# Patient Record
Sex: FEMALE | NOT HISPANIC OR LATINO | Employment: FULL TIME | URBAN - METROPOLITAN AREA
[De-identification: names, ages, dates, MRNs, and addresses within clinical notes are randomized per-mention and may not be internally consistent; named-entity substitution may affect disease eponyms.]

---

## 2019-08-15 ENCOUNTER — HOSPITAL ENCOUNTER (OUTPATIENT)
Dept: OTHER | Facility: HOSPITAL | Age: 64
Discharge: HOME | End: 2019-08-15
Attending: PHYSICIAN ASSISTANT

## 2019-08-29 ENCOUNTER — OFFICE VISIT (OUTPATIENT)
Dept: ORTHOPEDIC SURGERY | Facility: CLINIC | Age: 64
End: 2019-08-29

## 2019-08-29 DIAGNOSIS — M75.121 COMPLETE TEAR OF RIGHT ROTATOR CUFF: Primary | ICD-10-CM

## 2019-08-29 PROCEDURE — 99204 OFFICE O/P NEW MOD 45 MIN: CPT | Performed by: ORTHOPAEDIC SURGERY

## 2019-09-03 NOTE — PROGRESS NOTES
NEW VISIT    Patient: Maria Teresa Bergeron  ?  YOB: 1955    MRN: [id number]  ?  Chief Complaint   Patient presents with   • Right Shoulder - Pain, Establish Care      ?  HPI: The patient presents with right shoulder pain for the past 3 months.  It is moderate in severity and intermittent in nature.  There is associated clicking and popping at mid abduction.  She tends to drop her arm from the abducted position because of the pain and discomfort.  She denies any obvious history of trauma.  She does work in a bank as a teller and states that she has to repetitively use the upper extremity for day-to-day function.  The patient states that the shoulder dysfunction is affecting her daily life because of the pain.  She has tried Celebrex for anti-inflammatory medication which is somewhat helpful in managing her symptoms.  She has now started to develop some rest pain and night as well.  She is considering surgical options because she does not want temporary modalities of care for this condition.    Pain Location: right shoulder(s)  Radiation: none  Quality: sharp, stabbing  Intensity/Severity: moderate  Duration: 3 month(s)  Onset quality: gradual   Timing: intermittent  Aggravating Factors: reaching forward, reaching backward, reaching across the body and repetitive motion  Alleviating Factors: OTC analgesics, NSAID's and rest  Previous Episodes: none mentioned  Associated Symptoms: pain, swelling, decreased ROM, decreased strength  ADL Affected: personal cares, grooming and dressing upper body  Previous Treatment: Anti-inflammatory medication and physical therapy.    This patient is a new patient.  This problem is new to this examiner.      Allergies: Allergies not on file    Medications:   Home Medications:  No current outpatient medications on file prior to visit.     No current facility-administered medications on file prior to visit.      Current Medications:  Scheduled Meds:  PRN Meds:.    I have reviewed  the patient's medical history in detail and updated the computerized patient record.  Review and summarization of old records include:    No past medical history on file.  No past surgical history on file.  Social History     Occupational History   • Not on file   Tobacco Use   • Smoking status: Not on file   Substance and Sexual Activity   • Alcohol use: Not on file   • Drug use: Not on file   • Sexual activity: Not on file      Social History     Social History Narrative   • Not on file     No family history on file.      Review of Systems  Constitutional: Negative for appetite change.   HENT: Negative.    Eyes: Negative.    Respiratory: Negative.    Cardiovascular: Negative.    Gastrointestinal: Negative.    Endocrine: Negative.    Genitourinary: Negative.    Musculoskeletal: See details of exam below.  Skin: Negative.    Allergic/Immunologic: Negative.    Hematological: Negative.    Psychiatric/Behavioral: Negative.         Wt Readings from Last 3 Encounters:   No data found for Wt     Ht Readings from Last 3 Encounters:   No data found for Ht     There is no height or weight on file to calculate BMI.  No height and weight on file for this encounter.  There were no vitals filed for this visit.      Physical Exam  Constitutional: Patient is oriented to person, place, and time. Appears well-developed and well-nourished.   HENT:   Head: Normocephalic and atraumatic.   Eyes: Conjunctivae and EOM are normal. Pupils are equal, round, and reactive to light.   Cardiovascular: Normal rate, regular rhythm, normal heart sounds and intact distal pulses.   Pulmonary/Chest: Effort normal and breath sounds normal.   Musculoskeletal:   See detailed exam below   Neurological: Alert and oriented to person, place, and time. No sensory deficit. Coordination normal.   Skin: Skin is warm and dry. Capillary refill takes less than 2 seconds. No rash noted. No erythema.   Psychiatric: Patient has a normal mood and affect. Her behavior  is normal. Judgment and thought content normal.   Nursing note and vitals reviewed.      Ortho Exam:   Right shoulder (rct). The shoulder is extremely tender anteriorly. There is tenderness over the insertion of the rotator cuff over the greater tuberosity of the humerus. Slight proximal migration of the humeral head is noted. AC joint is tender. Crossover adduction test is positive. Drop arm sign is positive. Forward flexion is 0-110 degrees, abduction is 0-110 degrees, external rotation is 0-30 degrees. Patient has mild atrophy both of the supra and infraspinatus fossa. Sulcus sign is negative. There is no evidence of multidirectional instability. Neer test is positive on compression. Skin and soft tissue tenderness is noted. Patient's strength in abduction and external rotation are extremely lacking. The pain level is 6.      Diagnostics:  Reviewed MRI report of right shoulder, impression below:  The patient has an MRI which shows a full-thickness tear of the junction of the supra and the infraspinatus tendon.  There is 1.9 cm of retraction of the tendon.  The tendon gap is 0.9 cm.  There is some flattening of the superior lateral aspect of the humeral head possibly consistent with the previous fracture of the head.  No current edema is noted indicating that this is most likely a chronic injury.  There is a degenerative tear of the inferior labrum as well.  My recommendation is to proceed with arthroscopic surgery and mini open repair of the rotator cuff is based on clinical correlation of these findings along with MRI interpretation of the shoulder pathology.     Assessment:  Maria Teresa was seen today for pain and establish care.    Diagnoses and all orders for this visit:    Complete tear of right rotator cuff          Procedures  ?    Plan    · Intra-articular steroid injection discussed with the patient but she states that she would like to proceed with more definitive forms of care such as surgical  intervention.  · Rest, ice, compression, and elevation (RICE) therapy  · Stretching and strengthening exercises of the rotator cuff musculature.    · Surgical arthroscopy and mini open repair of the rotator cuff discussed with the patient to the extent of 45 minutes.  Greater than 50% of my time was spent face-to-face with the patient going over the treatment options of the potential complications and the time off work following the scheduling of the surgery.  Patient has been diagnosed with a rotator cuff tear.  These tears can range from partial tears to complete tears of the muscle and/or tendon. They can also be categorized in size by width as being small, medium or large.  Diagnosis is confirmed with MRI or CT/arthrogram.  Management of these tears can be conservative with injections, physical therapy, activity modification and use of NSAIDS as needed.  Surgery is the only way to actually fix these tears, as they will not heal or repair on their own.  These tears can usually be repaired with arthroscopic surgery, but occasionally open procedures are necessary.  Many factors can influence the outcomes. First, larger tears have a higher chance of failure from a healing perspective, although pain may improve nonetheless, potentially longer recovery and sometimes, depending on the quality of the tissue and the length of time the tear has been present, may not be repairable at all.  If the tear has been going on for a long time, the muscle can actually change to fatty tissue, and no longer act as muscle.  This can have a direct effect on not only the ability to repair the tear but also the outcome from the surgery itself both from a healing and functional perspective.  Therefore, the decision to proceed with surgery does have a time factor which can affect the quality of the tissue and reparability but also the potential for the tear to increase in size.  The longer you wait to repair the torn tendon there can be  decreased potential for a successful outcome.    · Risks of surgery have been discussed with the patient in detail.  These risks include but are not limited to possibility of infection, DVT, continued pain, continued progression of arthritis, use of allograft tissue, limited range of motion and strength and further surgical intervention.  Patient understands that the surgery will benefit mechanical symptoms of pain and instability but may not help with symptoms of arthritis.    · OTC Tylenol 500-1000mg by mouth every 6 hours as needed for pain   · Follow up in 6 week(s)    Date of encounter: 08/29/2019   Perico Elkins MD

## 2019-11-01 ENCOUNTER — TELEPHONE (OUTPATIENT)
Dept: ORTHOPEDIC SURGERY | Facility: CLINIC | Age: 64
End: 2019-11-01

## 2021-08-31 ENCOUNTER — TELEPHONE (OUTPATIENT)
Dept: GASTROENTEROLOGY | Facility: CLINIC | Age: 66
End: 2021-08-31

## 2024-08-05 ENCOUNTER — TELEPHONE (OUTPATIENT)
Dept: NEUROLOGY | Facility: CLINIC | Age: 69
End: 2024-08-05
Payer: MEDICARE

## 2024-08-05 NOTE — TELEPHONE ENCOUNTER
Provider: [facility]    Caller: SWAPNIL CONDE    Relationship to Patient: SELF      Phone Number: [phone]    Reason for Call: PATIENT REQUESTED AN APPOINTMENT REMINDER BE MAILED TO HER HOME    THANK YOU

## 2024-09-03 ENCOUNTER — OFFICE VISIT (OUTPATIENT)
Dept: NEUROLOGY | Facility: CLINIC | Age: 69
End: 2024-09-03
Payer: MEDICARE

## 2024-09-03 VITALS
HEIGHT: 63 IN | BODY MASS INDEX: 27.46 KG/M2 | OXYGEN SATURATION: 92 % | SYSTOLIC BLOOD PRESSURE: 114 MMHG | WEIGHT: 155 LBS | DIASTOLIC BLOOD PRESSURE: 68 MMHG | HEART RATE: 90 BPM | TEMPERATURE: 97.8 F

## 2024-09-03 DIAGNOSIS — K21.9 GASTROESOPHAGEAL REFLUX DISEASE WITHOUT ESOPHAGITIS: ICD-10-CM

## 2024-09-03 DIAGNOSIS — Z86.73 HISTORY OF TIA (TRANSIENT ISCHEMIC ATTACK): Primary | ICD-10-CM

## 2024-09-03 DIAGNOSIS — I65.23 BILATERAL CAROTID ARTERY STENOSIS: ICD-10-CM

## 2024-09-03 RX ORDER — VENLAFAXINE HYDROCHLORIDE 150 MG/1
150 CAPSULE, EXTENDED RELEASE ORAL
COMMUNITY

## 2024-09-03 RX ORDER — CYCLOBENZAPRINE HCL 5 MG
5 TABLET ORAL 2 TIMES DAILY PRN
COMMUNITY

## 2024-09-03 RX ORDER — HYDROXYZINE PAMOATE 25 MG/1
25 CAPSULE ORAL
COMMUNITY
Start: 2024-06-20

## 2024-09-03 RX ORDER — CLOPIDOGREL BISULFATE 75 MG/1
1 TABLET ORAL DAILY
COMMUNITY
Start: 2024-07-22 | End: 2024-09-03 | Stop reason: SDUPTHER

## 2024-09-03 RX ORDER — CHOLECALCIFEROL (VITAMIN D3) 1250 MCG
50000 CAPSULE ORAL WEEKLY
COMMUNITY

## 2024-09-03 RX ORDER — PANTOPRAZOLE SODIUM 20 MG/1
20 TABLET, DELAYED RELEASE ORAL DAILY
Qty: 30 TABLET | Refills: 5 | Status: SHIPPED | OUTPATIENT
Start: 2024-09-03 | End: 2025-09-03

## 2024-09-03 RX ORDER — OMEPRAZOLE 40 MG/1
40 CAPSULE, DELAYED RELEASE ORAL DAILY
COMMUNITY
End: 2024-09-03

## 2024-09-03 RX ORDER — CELECOXIB 200 MG/1
1 CAPSULE ORAL DAILY
COMMUNITY
Start: 2024-03-22

## 2024-09-03 RX ORDER — LEVOTHYROXINE SODIUM 88 MCG
1 TABLET ORAL DAILY
COMMUNITY
Start: 2024-07-24

## 2024-09-03 RX ORDER — ASPIRIN 81 MG
TABLET,CHEWABLE ORAL
COMMUNITY

## 2024-09-03 RX ORDER — CYANOCOBALAMIN (VITAMIN B-12) 1000 MCG
1 TABLET ORAL DAILY
COMMUNITY
Start: 2024-06-19

## 2024-09-03 RX ORDER — VENLAFAXINE HYDROCHLORIDE 75 MG/1
75 CAPSULE, EXTENDED RELEASE ORAL DAILY
COMMUNITY

## 2024-09-03 RX ORDER — CLOPIDOGREL BISULFATE 75 MG/1
75 TABLET ORAL DAILY
Qty: 30 TABLET | Refills: 1 | Status: SHIPPED | OUTPATIENT
Start: 2024-09-03

## 2024-09-03 RX ORDER — TRAZODONE HYDROCHLORIDE 50 MG/1
25 TABLET, FILM COATED ORAL
COMMUNITY

## 2024-09-03 RX ORDER — ALIROCUMAB 75 MG/ML
INJECTION, SOLUTION SUBCUTANEOUS
COMMUNITY
Start: 2024-07-30

## 2024-09-03 NOTE — PROGRESS NOTES
New Patient Office Visit      Encounter Date: 2024   Patient Name: Maria Teresa Bergeron  : 1955   MRN: [id number]   PCP: Martha Novoa APRN    Referring Provider: AUSTYN Sauer     Chief Complaint:    Chief Complaint   Patient presents with    Establish Care       History of Present Illness: Maria Teresa Bergeron is a 69 y.o. female who is here today to establish care.  Patient has a history of hypothyroidism, HLD, GERD and arthritis. Patient was seen at [facility] ED on 24 for a transient neurologic episode. She experienced right arm, neck pain and stiffness and dysarthria. Her symptoms subsided. CT head was negative for acute intracranial abnormality but does show chronic small vessel changes. CTA head and neck was negative for LVO but did show evidence of bilateral carotid stenosis, right ICA 50-60% and left 20%. MRI brain was negative for acute ischemic stroke but did show evidence of chronic small vessel changes and diffuse cerebral atrophy. Echocardiogram was negative for cardioembolic source of stroke. Triglycerides were found to be highly elevated at 824 and patient had borderline A1C of 6.4. Patient was discharged on ASA 81 mg, Plavix 75 mg.     Clinic visit 9/3/2024: Patient presents to clinic today to establish care following her recent admission for TIA. Etiology was felt to be atheroembolism. Patient was already sent to vascular surgery regarding her carotid stenosis. She cannot recall the name of the surgeon. She reports that vascular surgery did not feel she required any surgical intervention at this time. She will follow up in their office in 6 months. Patient reports she was told to continue ASA and Plavix but was not told how long. I have recommended a carotid U/S to establish a baseline for future follow up. Once this is completed and if stable will transition to antiplatelet monotherapy. She was taking ASA 81 mg daily when the event happened so likely will transition to Plavix  monotherapy.     Stroke Risk Factors: carotid stenosis, diabetes mellitus, hyperlipidemia, and hypertension      Subjective      Review of Systems:   Review of Systems   Constitutional:  Negative for activity change, appetite change, chills, diaphoresis, fatigue, fever, unexpected weight gain and unexpected weight loss.   Eyes:  Negative for blurred vision, double vision, photophobia and visual disturbance.   Respiratory:  Negative for cough and shortness of breath.    Cardiovascular:  Negative for chest pain, palpitations and leg swelling.   Gastrointestinal:  Negative for blood in stool, nausea and vomiting.   Genitourinary:  Negative for hematuria.   Musculoskeletal:  Positive for arthralgias and neck pain. Negative for gait problem.   Neurological:  Negative for dizziness, tremors, seizures, syncope, facial asymmetry, speech difficulty, weakness, light-headedness, numbness, headache, memory problem and confusion.   Psychiatric/Behavioral:  Negative for depressed mood.        Past Medical History:   Past Medical History:   Diagnosis Date    TIA (transient ischemic attack)        Past Surgical History:   Past Surgical History:   Procedure Laterality Date    GALLBLADDER SURGERY      TOTAL SHOULDER REPLACEMENT         Family History:   Family History   Problem Relation Age of Onset    Heart attack Mother     Alzheimer's disease Father        Social History:   Social History     Socioeconomic History    Marital status:    Tobacco Use    Smoking status: Never     Passive exposure: Never    Smokeless tobacco: Never   Vaping Use    Vaping status: Never Used   Substance and Sexual Activity    Alcohol use: Yes     Comment: occ    Drug use: Never    Sexual activity: Defer       Medications:     Current Outpatient Medications:     Aspirin Low Dose 81 MG chewable tablet, CHEW ONE TABLET BY MOUTH EVERY DAY, Disp: , Rfl:     celecoxib (CeleBREX) 200 MG capsule, Take 1 capsule by mouth Daily., Disp: , Rfl:      "Cholecalciferol (Vitamin D3) 1.25 MG (16475 UT) capsule, Take 1 capsule by mouth 1 (One) Time Per Week., Disp: , Rfl:     clopidogrel (PLAVIX) 75 MG tablet, Take 1 tablet by mouth Daily., Disp: 30 tablet, Rfl: 1    Cyanocobalamin (B-12) 1000 MCG tablet, Take 1 tablet by mouth Daily., Disp: , Rfl:     cyclobenzaprine (FLEXERIL) 5 MG tablet, Take 1 tablet by mouth 2 (Two) Times a Day As Needed for Muscle Spasms., Disp: , Rfl:     hydrOXYzine pamoate (VISTARIL) 25 MG capsule, 1 capsule., Disp: , Rfl:     Praluent 75 MG/ML solution auto-injector, INJECT 75MG UNDER THE SKIN EVERY TWO WEEKS, Disp: , Rfl:     Synthroid 88 MCG tablet, Take 1 tablet by mouth Daily., Disp: , Rfl:     traZODone (DESYREL) 50 MG tablet, Take 0.5 tablets by mouth every night at bedtime., Disp: , Rfl:     venlafaxine XR (EFFEXOR-XR) 150 MG 24 hr capsule, 1 capsule., Disp: , Rfl:     venlafaxine XR (EFFEXOR-XR) 75 MG 24 hr capsule, Take 1 capsule by mouth Daily., Disp: , Rfl:     pantoprazole (Protonix) 20 MG EC tablet, Take 1 tablet by mouth Daily., Disp: 30 tablet, Rfl: 5    Allergies:   Allergies   Allergen Reactions    Codeine Nausea Only     Makes \"sleepy and make me sick at my stomach\"    Penicillin G Rash       Objective     Physical Exam:  Vital Signs:   Vitals:    09/03/24 1357   BP: 114/68   Pulse: 90   Temp: 97.8 °F (36.6 °C)   SpO2: 92%   Weight: 70.3 kg (155 lb)   Height: 160 cm (63\")     Body mass index is 27.46 kg/m².     Physical Exam  Vitals and nursing note reviewed.   Constitutional:       General: She is awake. She is not in acute distress.     Appearance: Normal appearance. She is normal weight. She is not ill-appearing.      Comments: 69 year old  female    HENT:      Head: Normocephalic and atraumatic.      Nose: Nose normal.      Mouth/Throat:      Mouth: Mucous membranes are moist.   Eyes:      General: Lids are normal.      Extraocular Movements: Extraocular movements intact.      Pupils: Pupils are equal, round, " and reactive to light.   Cardiovascular:      Rate and Rhythm: Normal rate and regular rhythm.      Pulses: Normal pulses.   Pulmonary:      Effort: Pulmonary effort is normal. No respiratory distress.   Skin:     General: Skin is warm and dry.   Neurological:      General: No focal deficit present.      Mental Status: She is alert and oriented to person, place, and time. Mental status is at baseline.      Motor: Motor strength is normal.     Coordination: Coordination is intact.   Psychiatric:         Mood and Affect: Mood normal.         Speech: Speech normal.         Behavior: Behavior normal.       Neurological Exam  Mental Status  Awake and alert. Oriented to person, place, time and situation. Oriented to person, place, and time. Speech is normal. Language is fluent with no aphasia. Attention and concentration are normal. Fund of knowledge is appropriate for level of education.    Cranial Nerves  CN II: Visual acuity is normal. Visual fields full to confrontation.  CN III, IV, VI: Extraocular movements intact bilaterally. Normal lids and orbits bilaterally. Pupils equal round and reactive to light bilaterally.  CN V: Facial sensation is normal.  CN VII: Full and symmetric facial movement.  CN VIII: Hearing is normal to speech .  CN XI: Shoulder shrug strength is normal.  CN XII: Tongue midline without atrophy or fasciculations.    Motor  Normal muscle bulk throughout. No fasciculations present. Normal muscle tone. Strength is 5/5 throughout all four extremities.    Sensory  Light touch is normal in upper and lower extremities. Pinprick is normal in upper and lower extremities.     Coordination    Finger-to-nose, rapid alternating movements and heel-to-shin normal bilaterally without dysmetria.  No obvious dysmetria .    Gait  Casual gait is normal including stance, stride, and arm swing.     NIH 0     Modified Bynum Score: 0        0  No Symptoms    1 No significant disability. Able to carry out all usual  "activities, despite some symptoms.    2 Slight disability. Able to look after own affairs without assistance, but unable to carry out all previous activities.    3 Moderate disability. Requires some help, but able to walk unassisted.    4 Moderately severe disability. Unable to attend to own bodily needs without assistance, and unable to walk unassisted.    5 Severe disability. Requires constant nursing care and attention, bedridden, incontinent.    6 Dead       PHQ-9 Depression Screening  Little interest or pleasure in doing things? 0-->not at all   Feeling down, depressed, or hopeless? 0-->not at all   Trouble falling or staying asleep, or sleeping too much?     Feeling tired or having little energy?     Poor appetite or overeating?     Feeling bad about yourself - or that you are a failure or have let yourself or your family down?     Trouble concentrating on things, such as reading the newspaper or watching television?     Moving or speaking so slowly that other people could have noticed? Or the opposite - being so fidgety or restless that you have been moving around a lot more than usual?     Thoughts that you would be better off dead, or of hurting yourself in some way?     PHQ-9 Total Score 0   If you checked off any problems, how difficult have these problems made it for you to do your work, take care of things at home, or get along with other people?          STOP-Bang Score  Have you been diagnosed with Sleep Apnea?: no  Snoring?: no  Tired?: no  Observed?: no  Pressure?: no  Stop Score: 0  Body Mass Index more than 35 kg/m2?: no  Age older than 50 year old?: yes  Neck large? \">17\"/43cm-M, >16\"/41cm-F: no  Gender=Male?: no  Total Stop-Bang Score: 1       [facility] Fall Risk Clinician Key Questions   Have you fallen in the past year?: No  Do you feel unsteady with walking?: No  Are you worried about falling?: No      Imaging Reviewed:   Disc sent for upload  Laboratory Results:   A1C- 6.5  Total cholesterol- " 266  Triglycerides- 824  LDL- not reported       Assessment / Plan      Assessment/Plan:   Diagnoses and all orders for this visit:    1. History of TIA (transient ischemic attack) (Primary)  -discharged from [facility] on DAPT. Will continue ASA 81 mg and Plavix 75 mg for now. Will likely transition to Plavix monotherapy in the future after carotid U/S completed and stable.   -continue statin   -normal BP goals, <130/80    -if patient reports palpitations in the future, would consider holter  -reviewed lifestyle modification such as heart healthy diet and increased activity   -reviewed s/sxs of stroke and when to call 911  -follow up in stroke clinic in 3 months    2. Bilateral carotid artery stenosis  -     Duplex Carotid Ultrasound CAR; Future  -continue DAPT and statin for now  -if carotid U/S stable then will transition to Plavix monotherapy as patient was previously on ASA    3. Gastroesophageal reflux disease without esophagitis  -discontinued omeprazole as it can decrease the efficacy of Plavix  -     pantoprazole (Protonix) 20 MG EC tablet; Take 1 tablet by mouth Daily.  Dispense: 30 tablet; Refill: 5         Discussed the importance of medication compliance and lifestyle modifications (adequate blood pressure control, adequate control of hyperlipidemia, adequate glycemic control, increase physical activity, and healthy diet) to help reduce the risk of future cerebrovascular events.  Also discussed the signs symptoms that would warrant the patient return back to the emergency department including unilateral weakness, unilateral numbness, visual disturbances, loss of balance, speech difficulties, and/or a sudden severe headache.      Follow Up:   Return in about 3 months (around 12/3/2024).    AUSTYN Alfonso  [facility]

## 2024-09-04 PROBLEM — Z86.73 HISTORY OF TIA (TRANSIENT ISCHEMIC ATTACK): Status: ACTIVE | Noted: 2024-09-04
